# Patient Record
Sex: FEMALE | Race: WHITE | Employment: OTHER | URBAN - METROPOLITAN AREA
[De-identification: names, ages, dates, MRNs, and addresses within clinical notes are randomized per-mention and may not be internally consistent; named-entity substitution may affect disease eponyms.]

---

## 2017-01-25 ENCOUNTER — PROCEDURE VISIT (OUTPATIENT)
Dept: CARDIOLOGY CLINIC | Age: 82
End: 2017-01-25

## 2017-01-25 ENCOUNTER — HOSPITAL ENCOUNTER (OUTPATIENT)
Dept: CARDIOLOGY | Age: 82
Discharge: HOME | End: 2017-01-25
Attending: INTERNAL MEDICINE | Admitting: INTERNAL MEDICINE

## 2017-01-25 DIAGNOSIS — I35.1 AORTIC VALVE INSUFFICIENCY, UNSPECIFIED ETIOLOGY: Primary | ICD-10-CM

## 2017-01-25 DIAGNOSIS — I10 ESSENTIAL HYPERTENSION: ICD-10-CM

## 2017-01-25 LAB
LV EF: 53 %
LVEF MODALITY: NORMAL

## 2017-01-30 ENCOUNTER — TELEPHONE (OUTPATIENT)
Dept: CARDIOLOGY CLINIC | Age: 82
End: 2017-01-30

## 2017-02-01 ENCOUNTER — TELEPHONE (OUTPATIENT)
Dept: CARDIOLOGY CLINIC | Age: 82
End: 2017-02-01

## 2017-10-03 ENCOUNTER — OFFICE VISIT (OUTPATIENT)
Dept: CARDIOLOGY CLINIC | Age: 82
End: 2017-10-03

## 2017-10-03 VITALS
HEART RATE: 76 BPM | BODY MASS INDEX: 29.94 KG/M2 | SYSTOLIC BLOOD PRESSURE: 150 MMHG | WEIGHT: 169 LBS | DIASTOLIC BLOOD PRESSURE: 74 MMHG

## 2017-10-03 DIAGNOSIS — I10 ESSENTIAL HYPERTENSION: Primary | ICD-10-CM

## 2017-10-03 DIAGNOSIS — I25.83 CORONARY ARTERY DISEASE DUE TO LIPID RICH PLAQUE: ICD-10-CM

## 2017-10-03 DIAGNOSIS — I25.10 CORONARY ARTERY DISEASE DUE TO LIPID RICH PLAQUE: ICD-10-CM

## 2017-10-03 PROCEDURE — G8484 FLU IMMUNIZE NO ADMIN: HCPCS | Performed by: INTERNAL MEDICINE

## 2017-10-03 PROCEDURE — 1090F PRES/ABSN URINE INCON ASSESS: CPT | Performed by: INTERNAL MEDICINE

## 2017-10-03 PROCEDURE — 1123F ACP DISCUSS/DSCN MKR DOCD: CPT | Performed by: INTERNAL MEDICINE

## 2017-10-03 PROCEDURE — 1036F TOBACCO NON-USER: CPT | Performed by: INTERNAL MEDICINE

## 2017-10-03 PROCEDURE — G8419 CALC BMI OUT NRM PARAM NOF/U: HCPCS | Performed by: INTERNAL MEDICINE

## 2017-10-03 PROCEDURE — 99214 OFFICE O/P EST MOD 30 MIN: CPT | Performed by: INTERNAL MEDICINE

## 2017-10-03 PROCEDURE — 4040F PNEUMOC VAC/ADMIN/RCVD: CPT | Performed by: INTERNAL MEDICINE

## 2017-10-03 PROCEDURE — G8598 ASA/ANTIPLAT THER USED: HCPCS | Performed by: INTERNAL MEDICINE

## 2017-10-03 PROCEDURE — G8400 PT W/DXA NO RESULTS DOC: HCPCS | Performed by: INTERNAL MEDICINE

## 2017-10-03 PROCEDURE — G8427 DOCREV CUR MEDS BY ELIG CLIN: HCPCS | Performed by: INTERNAL MEDICINE

## 2017-10-03 NOTE — MR AVS SNAPSHOT
lansoprazole (PREVACID 24HR) 15 MG capsule Take 15 mg by mouth daily. glucosamine-chondroitin (GLUCOSAMINE CHONDR COMPLEX) 500-400 MG CAPS Take 1 capsule by mouth daily. Flaxseed, Linseed, 1000 MG CAPS Take 1 capsule by mouth daily. calcium carbonate (OSCAL) 500 MG TABS tablet Take 500 mg by mouth daily. Allergies              Benicar [Olmesartan] Other (See Comments)    Doxycycline Nausea Only    Macrobid [Nitrofurantoin Macrocrystal]     Procardia [Nifedipine]     Sulfa Antibiotics Other (See Comments)    weakness    Verelan [Verapamil]     Zantac [Ranitidine Hcl]     Ciprofloxacin Diarrhea, Rash         Additional Information        Basic Information     Date Of Birth Sex Race Ethnicity Preferred Language    1935 Female White Non-/Non  English      Problem List as of 10/3/2017  Date Reviewed: 10/3/2017                Hypertension    Ischemic stroke diagnosed during current admission [facility])    Accelerated hypertension    CAD (coronary artery disease) (Chronic)      Preventive Care        Date Due    Tetanus Combination Vaccine (1 - Tdap) 10/25/1956    Zoster Vaccine 10/25/1997    Yearly Flu Vaccine (1) 9/1/2017    Cholesterol Screening 7/4/2020            [employer] Signup           Jack[employer] allows you to send messages to your doctor, view your test results, renew your prescriptions, schedule appointments, view visit notes, and more. How Do I Sign Up? 1. In your Internet browser, go to [email or web address]  2. Click on the Sign Up Now link in the Sign In box. You will see the New Member Sign Up page. 3. Enter your Jackrabbit Access Code exactly as it appears below. You will not need to use this code after youve completed the sign-up process. If you do not sign up before the expiration date, you must request a new code. Jack[employer] Access Code: [email or web address]  Expires: 12/2/2017  4:04 PM    4.  Enter your Social Security Number ([id number]) and Date of Birth (melissa/nacho/yyyy) as indicated and click Submit. You will be taken to the next sign-up page. 5. Create a Numecent ID. This will be your Numecent login ID and cannot be changed, so think of one that is secure and easy to remember. 6. Create a [employer]ecent password. You can change your password at any time. 7. Enter your Password Reset Question and Answer. This can be used at a later time if you forget your password. 8. Enter your e-mail address. You will receive e-mail notification when new information is available in [address] 9. Click Sign Up. You can now view your medical record. Additional Information  If you have questions, please contact the physician practice where you receive care. Remember, [employer] is NOT to be used for urgent needs. For medical emergencies, dial 911. For questions regarding your [employer]ecent account call [phone]. If you have a clinical question, please call your doctor's office.

## 2017-10-03 NOTE — PROGRESS NOTES
by mouth 2 times daily. Yes Historical Provider, MD   Multiple Vitamins-Minerals (ALIVE ONCE DAILY WOMENS 50+ PO) Take 1 capsule by mouth daily. Yes Historical Provider, MD   cyanocobalamin 1000 MCG tablet Take 1,000 mcg by mouth daily. Yes Historical Provider, MD   raloxifene (EVISTA) 60 MG tablet Take 60 mg by mouth daily. Yes Historical Provider, MD   levothyroxine (SYNTHROID) 50 MCG tablet Take 50 mcg by mouth Daily. Yes Historical Provider, MD   nebivolol (BYSTOLIC) 10 MG tablet Take 10 mg by mouth daily. Yes Historical Provider, MD   atorvastatin (LIPITOR) 80 MG tablet Take 80 mg by mouth daily. Yes Historical Provider, MD   Omega-3 Fatty Acids (PX FISH OIL PO) Take 340-1,200 mg by mouth daily. Yes Historical Provider, MD   lansoprazole (PREVACID 24HR) 15 MG capsule Take 15 mg by mouth daily. Yes Historical Provider, MD   glucosamine-chondroitin (GLUCOSAMINE CHONDR COMPLEX) 500-400 MG CAPS Take 1 capsule by mouth daily. Yes Historical Provider, MD   Flaxseed, Linseed, 1000 MG CAPS Take 1 capsule by mouth daily. Yes Historical Provider, MD   calcium carbonate (OSCAL) 500 MG TABS tablet Take 500 mg by mouth daily. Yes Historical Provider, MD     Family History  History reviewed. No pertinent family history. Current Medications  Current Outpatient Prescriptions   Medication Sig Dispense Refill    amLODIPine (NORVASC) 5 MG tablet Take 5 mg by mouth daily      aspirin 325 MG tablet Take 325 mg by mouth daily      Coenzyme Q10 (CO Q-10) 200 MG CAPS Take 200 mg by mouth daily.  Cranberry Fruit Concentrate 40302 MG CAPS Take 25.2 capsules by mouth 2 times daily.  Multiple Vitamins-Minerals (ALIVE ONCE DAILY WOMENS 50+ PO) Take 1 capsule by mouth daily.  cyanocobalamin 1000 MCG tablet Take 1,000 mcg by mouth daily.  raloxifene (EVISTA) 60 MG tablet Take 60 mg by mouth daily.  levothyroxine (SYNTHROID) 50 MCG tablet Take 50 mcg by mouth Daily.       nebivolol (BYSTOLIC) 10 MG tablet Take 10 mg by mouth daily.  atorvastatin (LIPITOR) 80 MG tablet Take 80 mg by mouth daily.  Omega-3 Fatty Acids (PX FISH OIL PO) Take 340-1,200 mg by mouth daily.  lansoprazole (PREVACID 24HR) 15 MG capsule Take 15 mg by mouth daily.  glucosamine-chondroitin (GLUCOSAMINE CHONDR COMPLEX) 500-400 MG CAPS Take 1 capsule by mouth daily.  Flaxseed, Linseed, 1000 MG CAPS Take 1 capsule by mouth daily.  calcium carbonate (OSCAL) 500 MG TABS tablet Take 500 mg by mouth daily. No current facility-administered medications for this visit. REVIEW OF SYSTEMS:    CONSTITUTIONAL: No major weight gain or loss, fatigue, weakness, night sweats or fever. HEENT: No new vision difficulties or ringing in the ears. RESPIRATORY: No new SOB, PND, orthopnea or cough. CARDIOVASCULAR: See HPI  GI: No nausea, vomiting, diarrhea, constipation, abdominal pain or changes in bowel habits. : No urinary frequency, urgency, incontinence hematuria or dysuria. SKIN: No cyanosis or skin lesions. MUSCULOSKELETAL: No new muscle or joint pain. NEUROLOGICAL: No syncope or TIA-like symptoms. PSYCHIATRIC: No anxiety, pain, insomnia or depression    Objective:   PHYSICAL EXAM:        VITALS:    Wt Readings from Last 3 Encounters:   10/03/17 169 lb (76.7 kg)   12/21/16 169 lb 9.6 oz (76.9 kg)   08/05/15 172 lb 6.4 oz (78.2 kg)     BP Readings from Last 3 Encounters:   10/03/17 (!) 150/74   12/21/16 130/70   06/27/16 142/80     Pulse Readings from Last 3 Encounters:   10/03/17 76   12/21/16 72   06/27/16 66       CONSTITUTIONAL: Cooperative, no apparent distress, and appears well nourished / developed  NEUROLOGIC:  Awake and orientated to person, place and time. PSYCH: Calm affect. SKIN: Warm and dry. HEENT: Sclera non-icteric, normocephalic, neck supple, no elevation of JVP, normal carotid pulses with no bruits and thyroid normal size.   LUNGS:  No increased work of breathing and clear to auscultation, no present.  Thomas Brewster mitral regurgitation is present.   Trivial tricuspid regurgitation with RVSP estimated at 28 mmHg.   Mild aortic regurgitation is present.   Mild aortic stenosis. The aortic valve peak gradient is 27 mm Hg and the   mean gradient is 15 mmHg. Last Stress Test / Angiogram:  Angiographic Findings: 7/4/15  Left Main: normal     Left Anterior Descending: mid vessel stenosis 30%        Circumflex: normal        Right Coronary: Dominant with great flow. Previous stents are good patent with minimal plaque. There is mid vessel stenosis 50% and stable by previous cath note discription.         Left Ventriculogram: dynamic contractility EF 65% and there is apical diverticulum of no consequence.      Right ileofemoral: normal insertion at the bifurcation so no closure.          Hemodynamics:   Left Ventricular Pressure: 12  Central Aortic Pressure: 126     Assessment:stable coronary anatomy. No intervention needed. I do roman have cardiac reason for the tingling she complains of. Would be worthwhile to get neurological assessment. Ben Shanks to stop the Lovenox and the nitro patch. Last ECG: Sinus rhythm and normal EKG 75/m. On 8/5/15. Assessment:      CAD and post stenting. Plan: At this time she is stable from our perspective. I see no contraindication to proceeding with the surgery as being proposed. Heartstatus seems stable and we will plan to follow her as necessary. .  We will get current  EKG   Please call if we can assist further [phone]. Kavin Chiu.  Kavita DA SILVA, [facility]  Please carbon copy this note to Dr. Beatris Whitlock and to Dr. Rizwan England     This note was likely completed using voice recognition technology and may contain unintended errors

## 2017-10-30 ENCOUNTER — TELEPHONE (OUTPATIENT)
Dept: CARDIOLOGY CLINIC | Age: 82
End: 2017-10-30

## 2018-10-10 ENCOUNTER — HOSPITAL ENCOUNTER (OUTPATIENT)
Dept: MAMMOGRAPHY | Age: 83
Discharge: HOME | End: 2018-10-10
Payer: MEDICARE

## 2018-10-10 DIAGNOSIS — Z12.31 VISIT FOR SCREENING MAMMOGRAM: ICD-10-CM

## 2018-10-10 PROCEDURE — 77067 SCR MAMMO BI INCL CAD: CPT

## 2023-03-01 NOTE — PROGRESS NOTES
[facility]      Patient Name: Alec [address] Record Number:  [id number]  Primary Care Physician:  Maxwell Mitchell  Date of Consultation: 3/2/2023    Chief Complaint:   Chief Complaint   Patient presents with    Trauma     Patient hit face on table next to her bed and then landed on her face. Has not had any problems breathing, and not has had any pain. Ear Problem     Patient states since she was in the hospital her ears have been bothering her. She states her ears popped and since then she has been echoing. Was given drops but ran out. Wanted to get ears checked since she is here. HISTORY OF PRESENT ILLNESS  Dolly Burrows is a(n) 82 y.o. female who presents for evaluation of nasal bone fracture. She fell and hit her bedside table and had a CT scan which showed a minimally displaced nasal bone fracture. She was also in the hospital several months ago and stated that her ears pop and have not gone back to normal since that point time. She does have some echoing in the ears. She also has hearing loss but has not had any recent hearing test.  She is able to breathe well through her nose    Patient Active Problem List   Diagnosis    Ischemic stroke diagnosed during current admission [facility])    Accelerated hypertension    CAD (coronary artery disease)    Anginal syndrome ([facility] [address]     Past Surgical History:   Procedure Laterality Date    ABDOMEN SURGERY      CORONARY ANGIOPLASTY WITH STENT PLACEMENT       No family history on file. Social History     Socioeconomic History    Marital status:       Spouse name: Not on file    Number of children: Not on file    Years of education: Not on file    Highest education level: Not on file   Occupational History    Not on file   Tobacco Use    Smoking status: Never    Smokeless tobacco: Not on file   Vaping Use    Vaping Use: Never used   Substance and Sexual Activity    Alcohol use: No    Drug use: No    Sexual activity: Not on file   Other Topics Concern    Not on file   Social History Narrative    Not on file     Social Determinants of Health     Financial Resource Strain: Not on file   Food Insecurity: Not on file   Transportation Needs: Not on file   Physical Activity: Not on file   Stress: Not on file   Social Connections: Not on file   Intimate Partner Violence: Not on file   Housing Stability: Not on file       DRUG/FOOD ALLERGIES: Benicar [olmesartan], Doxycycline, Macrobid [nitrofurantoin macrocrystal], Procardia [nifedipine], Sulfa antibiotics, Verelan [verapamil], Zantac [ranitidine hcl], and Ciprofloxacin    CURRENT MEDICATIONS  Prior to Admission medications    Medication Sig Start Date End Date Taking? Authorizing Provider   amLODIPine (NORVASC) 5 MG tablet Take 5 mg by mouth daily    Historical Provider, MD   aspirin 325 MG tablet Take 325 mg by mouth daily    Historical Provider, MD   Coenzyme Q10 (CO Q-10) 200 MG CAPS Take 200 mg by mouth daily. Historical Provider, MD   Cranberry Fruit Concentrate 62496 MG CAPS Take 25.2 capsules by mouth 2 times daily. Historical Provider, MD   Multiple Vitamins-Minerals (ALIVE ONCE DAILY WOMENS 50+ PO) Take 1 capsule by mouth daily. Historical Provider, MD   cyanocobalamin 1000 MCG tablet Take 1,000 mcg by mouth daily. Historical Provider, MD   raloxifene (EVISTA) 60 MG tablet Take 60 mg by mouth daily. Historical Provider, MD   levothyroxine (SYNTHROID) 50 MCG tablet Take 50 mcg by mouth Daily. Historical Provider, MD   nebivolol (BYSTOLIC) 10 MG tablet Take 10 mg by mouth daily. Historical Provider, MD   atorvastatin (LIPITOR) 80 MG tablet Take 80 mg by mouth daily. Historical Provider, MD   Omega-3 Fatty Acids (PX FISH OIL PO) Take 340-1,200 mg by mouth daily. Historical Provider, MD   lansoprazole (PREVACID 24HR) 15 MG capsule Take 15 mg by mouth daily.     Historical Provider, MD   glucosamine-chondroitin (GLUCOSAMINE CHONDR COMPLEX) 500-400 MG CAPS Take 1 capsule by mouth daily. Historical Provider, MD   Flaxseed, Linseed, 1000 MG CAPS Take 1 capsule by mouth daily. Historical Provider, MD   calcium carbonate (OSCAL) 500 MG TABS tablet Take 500 mg by mouth daily. Historical Provider, MD     REVIEW OF SYSTEMS  The following systems were reviewed and revealed the following in addition to any already discussed in the HPI:    Review of Systems   Constitutional:  Negative for fatigue and fever. HENT:  Positive for hearing loss. Negative for congestion, ear pain, postnasal drip, rhinorrhea and sneezing. Eyes:  Negative for pain and visual disturbance. Respiratory:  Negative for cough and shortness of breath. Cardiovascular:  Negative for chest pain. Gastrointestinal:  Negative for nausea and vomiting. Endocrine: Negative. Genitourinary: Negative. Musculoskeletal:  Negative for neck pain and neck stiffness. Skin:  Negative for rash. Neurological:  Negative for dizziness and headaches.       PHYSICAL EXAM  /70 (Site: Left Upper Arm, Position: Sitting, Cuff Size: Medium Adult)   Pulse 94   Temp 97.9 °F (36.6 °C) (Infrared)   Ht 5' 3\" (1.6 m)   Wt 140 lb (63.5 kg)   SpO2 97%   BMI 24.80 kg/m²     GENERAL: No Acute Distress, Alert and Oriented, no hoarseness  EYES: EOMI, Anti-icteric  NOSE: No epistaxis, nasal mucosa within normal limits, no purulent drainage, nasal dorsum midline, nasal septum deviated to the left  EARS: Normal external canal appearance, EAC patent bilaterally, TMs intact bilaterally with no evidence of effusion  FACE: 1/6 House-Brackmann Scale, symmetric, sensation equal bilaterally  ORAL CAVITY: No masses or lesions palpated, uvula is midline, moist mucous membranes,   NECK: Normal range of motion, no thyromegaly, trachea is midline, no lymphadenopathy, no neck masses, no crepitus  CHEST: Normal respiratory effort, no retractions, breathing comfortably  SKIN: No rashes, normal appearing skin, no evidence of skin lesions/tumors    RADIOLOGY  Summary of findings:    PROCEDURE    ASSESSMENT/PLAN  Janessa Valdez is a very pleasant 82 y.o. female with     1. Closed fracture of nasal bone, initial encounter  CT performed at [facility] revealed a minimally displaced nasal bone fracture. This is not affecting her outward appearance and we will observe it as it will heal without incident    2. Deviated nasal septum  She has a deviated septum to the left which could have preceded the trauma. Again we will observe this    3. Bilateral hearing loss, unspecified hearing loss type  Hearing test was offered however they do not wish to proceed at this point in time. This could be the cause of the echoing if she has a normal ear exam today. Follow-up as needed. Medical Decision Making:   The following items were considered in medical decision making:  Independent review of images  Review / order clinical lab tests  Review / order radiology tests  Decision to obtain old records

## 2023-03-02 ENCOUNTER — OFFICE VISIT (OUTPATIENT)
Dept: ENT CLINIC | Age: 88
End: 2023-03-02
Payer: MEDICARE

## 2023-03-02 VITALS
DIASTOLIC BLOOD PRESSURE: 70 MMHG | HEIGHT: 63 IN | OXYGEN SATURATION: 97 % | BODY MASS INDEX: 24.8 KG/M2 | SYSTOLIC BLOOD PRESSURE: 106 MMHG | TEMPERATURE: 97.9 F | HEART RATE: 94 BPM | WEIGHT: 140 LBS

## 2023-03-02 DIAGNOSIS — H91.93 BILATERAL HEARING LOSS, UNSPECIFIED HEARING LOSS TYPE: ICD-10-CM

## 2023-03-02 DIAGNOSIS — J34.2 DEVIATED NASAL SEPTUM: ICD-10-CM

## 2023-03-02 DIAGNOSIS — S02.2XXA CLOSED FRACTURE OF NASAL BONE, INITIAL ENCOUNTER: Primary | ICD-10-CM

## 2023-03-02 PROCEDURE — 1123F ACP DISCUSS/DSCN MKR DOCD: CPT | Performed by: STUDENT IN AN ORGANIZED HEALTH CARE EDUCATION/TRAINING PROGRAM

## 2023-03-02 PROCEDURE — G8420 CALC BMI NORM PARAMETERS: HCPCS | Performed by: STUDENT IN AN ORGANIZED HEALTH CARE EDUCATION/TRAINING PROGRAM

## 2023-03-02 PROCEDURE — 1090F PRES/ABSN URINE INCON ASSESS: CPT | Performed by: STUDENT IN AN ORGANIZED HEALTH CARE EDUCATION/TRAINING PROGRAM

## 2023-03-02 PROCEDURE — G8484 FLU IMMUNIZE NO ADMIN: HCPCS | Performed by: STUDENT IN AN ORGANIZED HEALTH CARE EDUCATION/TRAINING PROGRAM

## 2023-03-02 PROCEDURE — G8427 DOCREV CUR MEDS BY ELIG CLIN: HCPCS | Performed by: STUDENT IN AN ORGANIZED HEALTH CARE EDUCATION/TRAINING PROGRAM

## 2023-03-02 PROCEDURE — 99203 OFFICE O/P NEW LOW 30 MIN: CPT | Performed by: STUDENT IN AN ORGANIZED HEALTH CARE EDUCATION/TRAINING PROGRAM

## 2023-03-02 PROCEDURE — G8400 PT W/DXA NO RESULTS DOC: HCPCS | Performed by: STUDENT IN AN ORGANIZED HEALTH CARE EDUCATION/TRAINING PROGRAM

## 2023-03-02 PROCEDURE — 3078F DIAST BP <80 MM HG: CPT | Performed by: STUDENT IN AN ORGANIZED HEALTH CARE EDUCATION/TRAINING PROGRAM

## 2023-03-02 PROCEDURE — 3074F SYST BP LT 130 MM HG: CPT | Performed by: STUDENT IN AN ORGANIZED HEALTH CARE EDUCATION/TRAINING PROGRAM

## 2023-03-02 PROCEDURE — 1036F TOBACCO NON-USER: CPT | Performed by: STUDENT IN AN ORGANIZED HEALTH CARE EDUCATION/TRAINING PROGRAM

## 2023-03-02 RX ORDER — LANOLIN ALCOHOL/MO/W.PET/CERES
3 CREAM (GRAM) TOPICAL NIGHTLY PRN
COMMUNITY

## 2023-03-02 RX ORDER — FERROUS SULFATE 325(65) MG
325 TABLET ORAL
COMMUNITY

## 2023-03-02 RX ORDER — POLYETHYLENE GLYCOL 3350 17 G/17G
17 POWDER, FOR SOLUTION ORAL DAILY
COMMUNITY

## 2023-03-02 RX ORDER — SODIUM BICARBONATE 650 MG/1
650 TABLET ORAL 2 TIMES DAILY
COMMUNITY

## 2023-03-02 RX ORDER — ERGOCALCIFEROL 1.25 MG/1
50000 CAPSULE ORAL WEEKLY
COMMUNITY

## 2023-03-02 RX ORDER — LORATADINE 10 MG/1
10 TABLET ORAL DAILY
COMMUNITY

## 2023-03-02 RX ORDER — AZELASTINE 1 MG/ML
1 SPRAY, METERED NASAL DAILY
COMMUNITY

## 2023-03-02 RX ORDER — MAGNESIUM OXIDE 400 MG/1
400 TABLET ORAL 2 TIMES DAILY
COMMUNITY

## 2023-03-02 RX ORDER — ECHINACEA PURPUREA EXTRACT 125 MG
1 TABLET ORAL 3 TIMES DAILY
COMMUNITY

## 2023-03-02 RX ORDER — ACYCLOVIR 200 MG/1
CAPSULE ORAL 2 TIMES DAILY
COMMUNITY

## 2023-03-02 RX ORDER — ALLOPURINOL 300 MG/1
300 TABLET ORAL DAILY
COMMUNITY

## 2023-03-02 RX ORDER — HYDROCORTISONE ACETATE 25 MG/1
25 SUPPOSITORY RECTAL EVERY 8 HOURS
COMMUNITY

## 2023-03-02 RX ORDER — MIRTAZAPINE 15 MG/1
15 TABLET, FILM COATED ORAL NIGHTLY
COMMUNITY

## 2023-03-02 RX ORDER — ONDANSETRON 4 MG/1
4 TABLET, ORALLY DISINTEGRATING ORAL EVERY 8 HOURS PRN
COMMUNITY

## 2023-03-02 RX ORDER — AMOXICILLIN 250 MG
2 CAPSULE ORAL DAILY
COMMUNITY

## 2023-03-02 RX ORDER — MECLIZINE HCL 12.5 MG/1
12.5 TABLET ORAL 3 TIMES DAILY PRN
COMMUNITY

## 2023-03-02 RX ORDER — ACETAMINOPHEN 500 MG
500 TABLET ORAL EVERY 4 HOURS PRN
COMMUNITY

## 2023-03-02 RX ORDER — SIMETHICONE 180 MG
180 CAPSULE ORAL EVERY 4 HOURS PRN
COMMUNITY

## 2023-03-02 RX ORDER — LIDOCAINE 40 MG/G
CREAM TOPICAL PRN
COMMUNITY

## 2023-03-02 RX ORDER — OMEPRAZOLE 20 MG/1
40 CAPSULE, DELAYED RELEASE ORAL DAILY
COMMUNITY

## 2023-03-02 RX ORDER — OXYCODONE HYDROCHLORIDE 5 MG/1
5 TABLET ORAL EVERY 8 HOURS PRN
COMMUNITY

## 2023-03-02 ASSESSMENT — ENCOUNTER SYMPTOMS
NAUSEA: 0
VOMITING: 0
EYE PAIN: 0
SHORTNESS OF BREATH: 0
RHINORRHEA: 0
COUGH: 0